# Patient Record
Sex: MALE | Race: WHITE | NOT HISPANIC OR LATINO | Employment: OTHER | URBAN - NONMETROPOLITAN AREA
[De-identification: names, ages, dates, MRNs, and addresses within clinical notes are randomized per-mention and may not be internally consistent; named-entity substitution may affect disease eponyms.]

---

## 2020-01-23 ENCOUNTER — TELEPHONE (OUTPATIENT)
Dept: NUCLEAR MEDICINE | Facility: HOSPITAL | Age: 80
End: 2020-01-23

## 2020-01-23 NOTE — TELEPHONE ENCOUNTER
Appointment Reminder Call    -Exam prep  -Where and when to register  -Appointment length with waiting time.   Recommended a book or crossword puzzle.    -Patient had a question in regards to insurance.   I transferred him to the billing department.

## 2020-01-24 ENCOUNTER — HOSPITAL ENCOUNTER (OUTPATIENT)
Dept: NUCLEAR MEDICINE | Facility: HOSPITAL | Age: 80
Setting detail: NUCLEAR MEDICINE
End: 2020-01-24
Attending: FAMILY MEDICINE
Payer: COMMERCIAL

## 2020-01-24 ENCOUNTER — HOSPITAL ENCOUNTER (OUTPATIENT)
Dept: NUCLEAR MEDICINE | Facility: HOSPITAL | Age: 80
Setting detail: NUCLEAR MEDICINE
Discharge: HOME | End: 2020-01-24
Attending: FAMILY MEDICINE | Admitting: FAMILY MEDICINE
Payer: COMMERCIAL

## 2020-01-24 ENCOUNTER — HOSPITAL ENCOUNTER (OUTPATIENT)
Dept: CARDIOLOGY | Facility: HOSPITAL | Age: 80
Setting detail: NUCLEAR MEDICINE
End: 2020-01-24
Attending: FAMILY MEDICINE
Payer: COMMERCIAL

## 2020-01-24 DIAGNOSIS — Z95.5 S/P CORONARY ARTERY STENT PLACEMENT: ICD-10-CM

## 2020-01-24 DIAGNOSIS — R07.9 CHEST PAIN, UNSPECIFIED: ICD-10-CM

## 2020-01-24 DIAGNOSIS — I25.10 ATHEROSCLEROSIS OF NATIVE CORONARY ARTERY OF NATIVE HEART WITHOUT ANGINA PECTORIS: ICD-10-CM

## 2020-01-24 LAB
CV BLOOD PRESSURE: 73 %
CV STRESS MAX HR HE: 137
NUC STRESS EJECTION FRACTION: 64 %
RATE PRESSURE PRODUCT: NORMAL
STRESS ECHO BASELINE DIASTOLIC HE: 80
STRESS ECHO BASELINE HR: 46
STRESS ECHO BASELINE SYSTOLIC BP: 158
STRESS ECHO CALCULATED PERCENT HR: 98 %
STRESS ECHO LAST STRESS DIASTOLIC BP: 76
STRESS ECHO LAST STRESS SYSTOLIC BP: 178
STRESS ECHO POST EXERCISE DUR MIN: 3 MIN
STRESS ECHO POST EXERCISE DUR SEC: 16 SEC
STRESS ECHO TARGET HR: 140

## 2020-01-24 PROCEDURE — 93018 CV STRESS TEST I&R ONLY: CPT | Performed by: INTERNAL MEDICINE

## 2020-01-24 PROCEDURE — 93017 CV STRESS TEST TRACING ONLY: CPT | Performed by: INTERNAL MEDICINE

## 2020-01-24 PROCEDURE — 34300033 ZZH RX 343: Performed by: RADIOLOGY

## 2020-01-24 PROCEDURE — A9500 TC99M SESTAMIBI: HCPCS | Performed by: RADIOLOGY

## 2020-01-24 PROCEDURE — 93016 CV STRESS TEST SUPVJ ONLY: CPT | Performed by: INTERNAL MEDICINE

## 2020-01-24 PROCEDURE — 78452 HT MUSCLE IMAGE SPECT MULT: CPT | Mod: TC

## 2020-01-24 RX ADMIN — Medication 10.6 MILLICURIE: at 07:00

## 2020-01-24 RX ADMIN — Medication 31.3 MILLICURIE: at 09:17

## 2022-10-26 ENCOUNTER — APPOINTMENT (OUTPATIENT)
Dept: GENERAL RADIOLOGY | Facility: OTHER | Age: 82
End: 2022-10-26
Payer: COMMERCIAL

## 2022-10-26 ENCOUNTER — PATIENT OUTREACH (OUTPATIENT)
Dept: CARE COORDINATION | Facility: OTHER | Age: 82
End: 2022-10-26

## 2022-10-26 ENCOUNTER — OFFICE VISIT (OUTPATIENT)
Dept: CARDIOLOGY | Facility: OTHER | Age: 82
End: 2022-10-26
Attending: NURSE PRACTITIONER
Payer: COMMERCIAL

## 2022-10-26 VITALS
HEIGHT: 68 IN | HEART RATE: 50 BPM | TEMPERATURE: 97.5 F | SYSTOLIC BLOOD PRESSURE: 128 MMHG | WEIGHT: 149 LBS | BODY MASS INDEX: 22.58 KG/M2 | DIASTOLIC BLOOD PRESSURE: 68 MMHG | OXYGEN SATURATION: 96 % | RESPIRATION RATE: 16 BRPM

## 2022-10-26 DIAGNOSIS — I25.10 ATHEROSCLEROSIS OF NATIVE CORONARY ARTERY OF NATIVE HEART WITHOUT ANGINA PECTORIS: ICD-10-CM

## 2022-10-26 DIAGNOSIS — Z79.01 CHRONIC ANTICOAGULATION: ICD-10-CM

## 2022-10-26 DIAGNOSIS — I48.91 ATRIAL FIBRILLATION (H): Primary | ICD-10-CM

## 2022-10-26 DIAGNOSIS — I48.0 PAF (PAROXYSMAL ATRIAL FIBRILLATION) (H): Primary | ICD-10-CM

## 2022-10-26 PROBLEM — I10 ESSENTIAL HYPERTENSION: Status: ACTIVE | Noted: 2021-03-29

## 2022-10-26 PROBLEM — R00.1 BRADYCARDIA: Status: ACTIVE | Noted: 2017-05-25

## 2022-10-26 PROBLEM — H53.15 METAMORPHOPSIA: Status: ACTIVE | Noted: 2022-02-22

## 2022-10-26 PROBLEM — N40.1 BENIGN PROSTATIC HYPERPLASIA WITH NOCTURIA: Status: ACTIVE | Noted: 2021-04-02

## 2022-10-26 PROBLEM — H43.391 FLOATERS IN VISUAL FIELD, RIGHT: Status: ACTIVE | Noted: 2021-06-22

## 2022-10-26 PROBLEM — H54.61 DECREASED VISION OF RIGHT EYE: Status: ACTIVE | Noted: 2022-02-22

## 2022-10-26 PROBLEM — R35.1 BENIGN PROSTATIC HYPERPLASIA WITH NOCTURIA: Status: ACTIVE | Noted: 2021-04-02

## 2022-10-26 PROCEDURE — G0463 HOSPITAL OUTPT CLINIC VISIT: HCPCS

## 2022-10-26 PROCEDURE — 99204 OFFICE O/P NEW MOD 45 MIN: CPT | Performed by: NURSE PRACTITIONER

## 2022-10-26 PROCEDURE — 93005 ELECTROCARDIOGRAM TRACING: CPT

## 2022-10-26 PROCEDURE — 93010 ELECTROCARDIOGRAM REPORT: CPT | Performed by: INTERNAL MEDICINE

## 2022-10-26 PROCEDURE — G0463 HOSPITAL OUTPT CLINIC VISIT: HCPCS | Mod: 25

## 2022-10-26 RX ORDER — WARFARIN SODIUM 5 MG/1
5 TABLET ORAL
COMMUNITY
Start: 2022-10-17

## 2022-10-26 RX ORDER — NITROGLYCERIN 0.4 MG/1
TABLET SUBLINGUAL
COMMUNITY
Start: 2021-03-11

## 2022-10-26 ASSESSMENT — PAIN SCALES - GENERAL: PAINLEVEL: NO PAIN (0)

## 2022-10-26 NOTE — NURSING NOTE
"Chief Complaint   Patient presents with     Consult     Referred by Mir Arcos for A Fib       Initial BP (!) 160/95 (BP Location: Left arm, Cuff Size: Adult Regular)   Pulse 50   Temp 97.5  F (36.4  C)   Resp 16   Ht 1.727 m (5' 8\")   Wt 67.6 kg (149 lb)   SpO2 96%   BMI 22.66 kg/m   Estimated body mass index is 22.66 kg/m  as calculated from the following:    Height as of this encounter: 1.727 m (5' 8\").    Weight as of this encounter: 67.6 kg (149 lb).  Medication Reconciliation: complete  Andree Day LPN  "

## 2022-10-26 NOTE — PROGRESS NOTES
"[facility] HEART CARE   CARDIOLOGY CONSULT     Derick Carballo   [address]  [facility] [place] [address]      No Ref-Primary, Physician     Chief Complaint   Patient presents with     Consult     Referred by Rylie Garsia  for A Fib        HPI:   Derick Carballo is a pleasant 82-year old male who presents for cardiology consult. He has a cardiac history including non-obstructive CAD on [facility] 9/2015, paroxysmal atrial fibrillation, CAD s/p stent to LAD in 2007 at [facility]. He has no significant non-cardiac history.    Episodes of lightheaded/dizziness in September 2022: He was doing dishes and bent down and felt dizzy. A second episode he was standing at the toilet and felt lightheaded for a few seconds- he denies falling. He has not had a recurrence of dizziness/lightheadedness. He thinks it was from starting the Coumadin. He has not felt this in several weeks.     He denies chest pain or pressure, no discomfort in upper extremities/neck/jaw/intrascapular area. Denies any dyspnea, dyspnea on exertion. He tells me that he is very physically active- gardens, digging post-holes, weeding, tilling, hikes across his 40 acre piece of property that he estimates is about 0.75 mile or more there and back to get the male, housework- up and down his stairs, carries firewood inside and downstairs for heating. He tolerates all this physical activity without symptoms of chest discomfort, dyspnea, lightheaded or dizziness.  He does go to dances and goes for several hours dancing. He endorses \"it's a workout\" but tolerates doing this activity without symptoms.     Previously was taking Aspirin 81 mg once daily but has not taken in many, many years. Endorses his stomach could not handle this. He is not interested in re-starting this.     Previously on a statin medication. He endorses that he has not taken this since 2 months after his stent and no desire to re-start.     Smoking History: Never    Alcohol History: " Rare    Substance Abuse History: None    Sleep History: Sleeps in bed. Gets up at 4 am to void (thinks he may be having prostate issues). He does wake up most mornings feeling well rested. No daytime fatigue. Never naps during the day.      Family History: No significant family history of cardiac issues. Mom passed away at age 90 from thoracic aneurysm, maternal aunt had abdominal aneurysm. He does have a son aged 63 without any cardiac issues yet.         RELEVANT TESTING:  Left heart catheterization at [facility] 9/2015   CARDIAC FINDINGS:   DOMINANCE:   Co-dominant Dominant   LEFT MAIN:   is angiographically normal (0% Stenosis)   LEFT ANTERIOR DESCENDING :   and its branches are angiographically normal (0% Stenosis)  the previous sftent in the mid LAD is   widely pateent  20-30 percent in stent  expected and non obstructive   CIRCUMFLEX:   and its branches are angiographically normal (0% Stenosis)   RIGHT CORONARY ARTERY:   and its branches are angiographically normal (0% Stenosis)   COLLATERALS:   No collateral flow      PAST MEDICAL HISTORY:   History reviewed. No pertinent past medical history.       FAMILY HISTORY:   History reviewed. No pertinent family history.       PAST SURGICAL HISTORY:   Past Surgical History:   Procedure Laterality Date     APPENDECTOMY       STENT      2007   [facility]          SOCIAL HISTORY:   Social History     Socioeconomic History     Marital status: Single          CURRENT MEDICATIONS:   Current Outpatient Medications   Medication     nitroGLYcerin (NITROSTAT) 0.4 MG sublingual tablet     warfarin ANTICOAGULANT (COUMADIN) 5 MG tablet     No current facility-administered medications for this visit.            ALLERGIES:   No Known Allergies       ROS:   CONSTITUTIONAL: No reported fever or chills. No changes in weight.  ENT: No visual disturbance, ear ache, epistaxis or sore throat.   CARDIOVASCULAR: No chest pain, chest pressure or chest discomfort. No  "palpitations or lower extremity edema.   RESPIRATORY: No shortness of breath, dyspnea upon exertion, cough, wheezing or hemoptysis.   GI: +Change in bowel habit when traveling (diarrhea/gas). No abdominal pain. No nausea, vomiting. No melena or hematochezia.   : No hematuria or dysuria. No hesitancy or incontinence.   NEUROLOGICAL: +lightheaded/dizziness which has resolved (See HPI). No headache, syncope, ataxia, paresthesias or weakness.   HEMATOLOGIC: No bleeding or excessive bruising. No history of blood clots.   MUSCULOSKELETAL: No joint pain or swelling, no muscle pain.  ENDOCRINOLOGIC: No temperature intolerance. No hair or skin changes.  SKIN: No abnormal rashes or sores, no unusual itching.  PSYCHIATRIC: No changes in mood, feeling down or anxious. No changes in sleep.      PHYSICAL EXAM:     Vitals: /68 (BP Location: Right arm, Patient Position: Sitting)   Pulse 50   Temp 97.5  F (36.4  C)   Resp 16   Ht 1.727 m (5' 8\")   Wt 67.6 kg (149 lb)   SpO2 96%   BMI 22.66 kg/m    BMI= Body mass index is 22.66 kg/m .    GENERAL: The patient is a well-developed, well-nourished, in no apparent distress.  HEENT: Head is normocephalic and atraumatic. Eyes are symmetrical with normal visual tracking. No icterus, no xanthelasmas.  NECK: Supple. No adenopathy, asymmetry or masses. Carotid upstroke are normal bilaterally, no cervical bruits, JVP not visible.   CHEST/ LUNGS: Lungs clear to auscultation, no rales, rhonchi or wheezes, no use of accessory muscles, no retractions, respirations unlabored and normal respiratory rate.   CARDIO: Regular rate and rhythm normal with S1 and S2, no S3 or S4 and no murmur, click or rub. Precordium quiet with normal PMI.    ABD: Abdomen is soft and nontender, nondistended. no palpable masses, and no abdominal bruits heard.   EXTREMITIES: No clubbing, cyanosis or edema present. Peripheral pulses normal and equal bilaterally.  VASC: Radial, femoral, dorsalis pedis and " posterior tibialis pulses normal and symmetric with no vascular bruits heard.  MUSCULOSKELETAL: No joint swelling.   NEUROLOGIC: Alert and oriented X3. Normal speech, gait and affect. No focal neurologic deficits.   SKIN: No jaundice. No rashes or visible skin lesions present. No ecchymosis.            EKG Interpretation:      Rhythm: sinus rhythm  Rate: bradycardia- 47 bpm- asymptomatic  Axis: Left Axis Deviation  Ectopy: premature ventricular contractions  Conduction: prolonged RADHA 254 ms, normal QRS duration 114 ms, normal  ms, normal QTc 407 ms  ST Segments/ T Waves: No acute ischemic changes  Q Waves: None  Comparison to prior: No old EKG available      LAB RESULTS:   Orders placed or performed in visit on 10/26/22     warfarin ANTICOAGULANT (COUMADIN) 5 MG tablet     nitroGLYcerin (NITROSTAT) 0.4 MG sublingual tablet          ASSESSMENT:   Derick Carballo is a pleasant 82-year old male who presents for cardiology consult. He has a cardiac history including non-obstructive CAD on [facility] 9/2015, paroxysmal atrial fibrillation, CAD s/p stent to LAD in 2007 at [facility]. He has no significant non-cardiac history.    Mr. Carballo tells me that he had two episodes of lightheaded/dizziness with position change after starting coumadin and after reading the package information it encouraged him to be evaluated so he went into the ED. He denies near-syncope, chest pain, dyspnea. He strongly endorses he feels well and that his symptoms back in September which have not recurred were due to starting the coumadin.       PLAN:   1. Paroxysmal atrial fibrillation    Symptomatic with lightheadedness    Denies chest pain or pressure, dyspnea on exertion    Zio patch to evaluate atrial fibrillation burden, heart rate and if feeling symptomatic could trigger device. Mr. Carballo strongly endorses he has not had any recurrence of lightheaded/dizziness since adjusting to the coumadin and thinks this was the cause of  his symptoms.    Echocardiogram to evaluate heart structure and function    2. CHADs-VASC >=2     He is on chronic oral anticoagulation therapy for stroke prophylaxis due to paroxysmal atrial fibrillation with coumadin     Tolerating without signs/symptoms of bleeding    He does enjoy eating healthy, gardening and eating from his garden. We discussed DOACs which can be cost-prohibitive but if concerns with INR could consider switching.     3. ASCVD s/p stent to LAD in 2007 at [facility]    No anginal or anginal equivalent symptoms    He chooses to not be on statin therapy.     He chooses to not be on aspirin    He is uncertain if he will proceed with above testing since he is feeling well. If he does proceed with testing we will see him in follow-up at the [facility] clinic since that is closer for him.       Thank you for allowing me to participate in the care of your patient. Please do not hesitate to contact me if you have any questions.     Total time spent on day of visit, including review of tests, obtaining/reviewing separately obtained history, ordering medications/tests/procedures, communicating with PCP/consultants, and documenting in electronic medical record: 55 minutes.       Katie Briones, CNP

## 2022-10-26 NOTE — PATIENT INSTRUCTIONS
You should receive a call to schedule echocardiogram and put on ziopatch monitor  Follow-up with cardiology at the [facility] clinic after all testing is complete    Katie Briones, CNP

## 2022-10-28 PROBLEM — Z79.01 CHRONIC ANTICOAGULATION: Status: ACTIVE | Noted: 2022-10-28

## 2022-10-28 PROBLEM — I48.0 PAF (PAROXYSMAL ATRIAL FIBRILLATION) (H): Status: ACTIVE | Noted: 2022-09-20
